# Patient Record
Sex: MALE | Race: WHITE | URBAN - METROPOLITAN AREA
[De-identification: names, ages, dates, MRNs, and addresses within clinical notes are randomized per-mention and may not be internally consistent; named-entity substitution may affect disease eponyms.]

---

## 2018-09-10 ENCOUNTER — OFFICE VISIT (OUTPATIENT)
Dept: ENDOCRINOLOGY | Age: 48
End: 2018-09-10

## 2018-09-10 VITALS
HEIGHT: 70 IN | HEART RATE: 61 BPM | SYSTOLIC BLOOD PRESSURE: 98 MMHG | WEIGHT: 165.5 LBS | DIASTOLIC BLOOD PRESSURE: 61 MMHG | BODY MASS INDEX: 23.69 KG/M2

## 2018-09-10 DIAGNOSIS — E03.9 ACQUIRED HYPOTHYROIDISM: Primary | ICD-10-CM

## 2018-09-10 NOTE — MR AVS SNAPSHOT
[address] [place] [address] [address] [address] [phone] Patient: Cem Coleman MRN: [id number] TNN:4/64/8109 Visit Information Date & Time Provider Department Dept. Phone Encounter #  
 9/10/2018  2:50 PM [address] [address] [place] [facility] [phone] [id number] Follow-up Instructions Return in about 2 months (around 11/10/2018). Upcoming Health Maintenance Date Due DTaP/Tdap/Td series (1 - Tdap) 5/30/1989 FOBT Q 1 YEAR AGE 50-75 5/30/2018 Influenza Age 5 to Adult 8/1/2018 Allergies as of 9/10/2018  Review Complete On: 9/10/2018 By: Elbert Murrieta MD  
 No Known Allergies Current Immunizations  Never Reviewed No immunizations on file. Not reviewed this visit You Were Diagnosed With   
  
 Codes Comments Acquired hypothyroidism    -  Primary ICD-10-CM: E03.9 ICD-9-CM: 617. 9 Vitals BP Pulse Height(growth percentile) Weight(growth percentile) BMI Smoking Status 98/61 (BP 1 Location: Left arm, BP Patient Position: Sitting) 61 5' 10\" (1.778 m) 165 lb 8 oz (75.1 kg) 23.75 kg/m2 Never Smoker BMI and BSA Data Body Mass Index Body Surface Area  
 23.75 kg/m 2 1.93 m 2 Preferred Pharmacy Pharmacy Name Phone [id number] [phone] Your Updated Medication List  
  
Notice  As of 9/10/2018  3:10 PM  
 You have not been prescribed any medications. We Performed the Following T4, FREE G1011922 CPT(R)] THYROGLOBULIN AB [id number] CPT(R)] THYROID PEROXIDASE (TPO) AB [34631 CPT(R)] TSH 3RD GENERATION [45141 CPT(R)] Follow-up Instructions Return in about 2 months (around 11/10/2018). Patient Instructions PLEASE READ THESE INSTRUCTIONS:  
 
 If we start thyroid hormone, reemember to take levothyroxine with a glass of water on an empty stomach each day in the mornings, 1 hour prior to ingesting any food or other medications, including vitamins. We will plan for you to return to clinic in 2 months for re-evaluation, Devon Toribio. [address] [facility] End[facility] SERVICES! Dear Emelia Tran: Thank you for requesting a [employer] account. Our records indicate that you already have an active [employer] account. You can access your account anytime at [email or web address] Did you know that you can access your hospital and ER discharge instructions at any time in [employer]? You can also review all of your test results from your hospital stay or ER visit. Additional Information If you have questions, please visit the Frequently Asked Questions section of the [employer] website at [email or web address] Remember, [employer] is NOT to be used for urgent needs. For medical emergencies, dial 911. Now available from your iPhone and Android! Please provide this summary of care documentation to your next provider. Your primary care clinician is listed as Phys Other. If you have any questions after today's visit, please call [phone].

## 2018-09-10 NOTE — PROGRESS NOTES
CONSULTATION REQUESTED BY: [facility]    REASON FOR CONSULT: Hypothyroidism    CHIEF COMPLAINT: Evaluation for hypothyroidism    HISTORY OF PRESENT ILLNESS:   Eusebia Young is a 46 y.o. male with a PMHx as noted below who was referred to our endocrinology clinic for evaluation of hypothyroidism. Thyroid History:  Diagnosed by [facility] Nov 2017 with TSH 4.69,  He was prescribed levothyroxine but did not start it,  Patient denies history of trauma/surgery in the neck,  Family history is not significant for thryoid disease,  Symptoms reported include fatigue, progressive,  No goiter or dysphagia,  Review of outside lab: 11/8/17: TSH 4.69  Patient has not had a subsequent evaluation since that time. He is not on any medications and no history of chronic medical conditions. PAST MEDICAL/SURGICAL HISTORY:   No past medical history on file. No past surgical history on file. ALLERGIES:   No Known Allergies    MEDICATIONS ON ADMISSION:   No current outpatient prescriptions on file. SOCIAL HISTORY:   Social History     Social History    Marital status:      Spouse name: N/A    Number of children: N/A    Years of education: N/A     Occupational History    Not on file. Social History Main Topics    Smoking status: Never Smoker    Smokeless tobacco: Never Used    Alcohol use Yes    Drug use: Not on file    Sexual activity: Not on file     Other Topics Concern    Not on file     Social History Narrative       FAMILY HISTORY:  Family History   Problem Relation Age of Onset    Cancer Father      bladder cancer age 76       REVIEW OF SYSTEMS: Complete ROS assessed and noted for that which is described above, all else are negative.   Eyes: normal  ENT: normal  CVS: normal  Resp: normal  GI: normal  : normal  GYN: normal  Endocrine: normal  Integument: normal  Musculoskeletal: normal  Neuro: normal  Psych: normal      PHYSICAL EXAMINATION:    VITAL SIGNS:  Visit Vitals    BP 98/61 (BP 1 Location: Left arm, BP Patient Position: Sitting)    Pulse 61    Ht 5' 10\" (1.778 m)    Wt 165 lb 8 oz (75.1 kg)    BMI 23.75 kg/m2       GENERAL: NCAT, Sitting comfortably, NAD  EYES: EOMI, non-icteric, no proptosis  Ear/Nose/Throat: NCAT, no inflammation, no masses, thyroid gland is not appreciably enlarged  LYMPH NODES: No LAD  CARDIOVASCULAR: S1 S2, RRR, No murmur, 2+ radial pulses  RESPIRATORY: CTA b/l, no wheeze/rales  GASTROINTESTINAL: ND  MUSCULOSKELETAL: Normal ROM, no atrophy  SKIN: warm, no edema/rash/ or other skin changes  NEUROLOGIC: 5/5 power all extremities, no tremor, AAOx3  PSYCHIATRIC: Normal affect, Normal insight and judgement     REVIEW OF LABORATORY AND RADIOLOGY DATA:   Labs and documentation have been reviewed as described above. ASSESSMENT AND PLAN:   Michael Raymundo is a 46 y.o. male with a PMHx as noted above who was referred to our endocrinology clinic for evaluation of hypothyroidism. Hypothyroidism    Today, using a drawing board, we spent time discussing the physiologic mechanisms which govern thyroid hormone regulation and the normal responses to abnormal thyroid function. We also discussed their current condition in the setting of this physiologic response. In his particular case, his symptoms are suggestive of potential hypothyroidism, however we would certainly need to evaluate his current levels, and will check for thyroid antibodies as well which would help with diagnosis/prognosis. Today we will check a TSH & FT4 level, and TPO/TgAb,  Will review results and determine if thyroid hormone replacement is appropriate,  Advised that if started on treatment, to take levothyroxine with a glass of water on an empty stomach each day in the mornings, 1 hour prior to ingesting any food or other medications, including vitamins. Plan to RTC in 2 months, will order prelabs depending on results,    [employer].  [facility] Endocr[facility]

## 2018-09-10 NOTE — PATIENT INSTRUCTIONS
PLEASE READ THESE INSTRUCTIONS:     If we start thyroid hormone, reemember to take levothyroxine with a glass of water on an empty stomach each day in the mornings, 1 hour prior to ingesting any food or other medications, including vitamins. We will plan for you to return to clinic in 2 months for re-evaluation,    Edmund Norton.  [address] [facility] Endocrinology  [address] [place] [address]

## 2018-09-11 LAB
T4 FREE SERPL-MCNC: 1.16 NG/DL (ref 0.82–1.77)
THYROGLOB AB SERPL-ACNC: <1 IU/ML (ref 0–0.9)
THYROPEROXIDASE AB SERPL-ACNC: 11 IU/ML (ref 0–34)
TSH SERPL DL<=0.005 MIU/L-ACNC: 2.62 UIU/ML (ref 0.45–4.5)

## 2018-09-11 NOTE — PROGRESS NOTES
Thyroid levels look normal,  TSH 2.6, FT4 1.16,  Tg Ab negative, TPO antibody negative,  no evidence of thyroid disease, also no goiter on exam, no role for thyroid hormone replacement at the present time. I called patient to notify him,     Willy Aguilar.  [address] [facility] Endocrinology  [address] [place] [address]

## 2018-11-30 ENCOUNTER — OFFICE VISIT (OUTPATIENT)
Dept: ENDOCRINOLOGY | Age: 48
End: 2018-11-30

## 2018-11-30 VITALS
BODY MASS INDEX: 23.69 KG/M2 | DIASTOLIC BLOOD PRESSURE: 60 MMHG | SYSTOLIC BLOOD PRESSURE: 95 MMHG | HEART RATE: 72 BPM | HEIGHT: 70 IN | WEIGHT: 165.5 LBS

## 2018-11-30 DIAGNOSIS — E03.9 ACQUIRED HYPOTHYROIDISM: Primary | ICD-10-CM

## 2018-11-30 NOTE — PROGRESS NOTES
CHIEF COMPLAINT: f/u evaluation for hypothyroidism. HISTORY OF PRESENT ILLNESS:   Shoaib Escalante is a 46 y.o. male with a PMHx as noted below who presents to the endocrinology clinic for f/u evaluation of hypothyroidism. Diagnosed by [facility] Nov 2017 with TSH 4.69,  He was prescribed levothyroxine but did not start it,  We evaluated patient prev on initial visit and was noted for normal thyroid levels, neg TPO,   Patient was advised thyroid hormone replacement was not warranted,   He presents today for routine f/u,   Patient note some routine fatigue, denies dysphagia,   Review of most recent thyroid function:  Lab Results   Component Value Date    TSH 2.620 09/10/2018    FT4 1.16 09/10/2018    TMCLT 11 09/10/2018      TSILT = Thyroid stimulating antibodies  TMCLT = TPO antibodies  T3LT = Total T3 levels    PAST MEDICAL/SURGICAL HISTORY:   No past medical history on file. No past surgical history on file. ALLERGIES:   No Known Allergies    MEDICATIONS ON ADMISSION:   No current outpatient medications on file. SOCIAL HISTORY:   Social History     Socioeconomic History    Marital status:      Spouse name: Not on file    Number of children: Not on file    Years of education: Not on file    Highest education level: Not on file   Social Needs    Financial resource strain: Not on file    Food insecurity - worry: Not on file    Food insecurity - inability: Not on file    Transportation needs - medical: Not on file   [employer] needs - non-medical: Not on file   Occupational History    Not on file   Tobacco Use    Smoking status: Never Smoker    Smokeless tobacco: Never Used   Substance and Sexual Activity    Alcohol use:  Yes    Drug use: Not on file    Sexual activity: Not on file   Other Topics Concern    Not on file   Social History Narrative    Not on file       FAMILY HISTORY:  Family History   Problem Relation Age of Onset    Cancer Father         bladder cancer age 68       REVIEW OF SYSTEMS: Complete ROS assessed and noted for that which is described above, all else are negative. Eyes: normal  ENT: normal  CVS: normal  Resp: normal  GI: normal  : normal  GYN: normal  Endocrine: normal  Integument: normal  Musculoskeletal: normal  Neuro: normal  Psych: normal      PHYSICAL EXAMINATION:    VITAL SIGNS:  Visit Vitals  BP 95/60 (BP 1 Location: Left arm, BP Patient Position: Sitting)   Pulse 72   Ht 5' 10\" (1.778 m)   Wt 165 lb 8 oz (75.1 kg)   BMI 23.75 kg/m²       GENERAL: NCAT, Sitting comfortably, NAD  EYES: EOMI, non-icteric, no proptosis  Ear/Nose/Throat: NCAT, no inflammation, no masses  LYMPH NODES: No LAD  CARDIOVASCULAR: S1 S2, RRR, No murmur, 2+ radial pulses  RESPIRATORY: CTA b/l, no wheeze/rales  GASTROINTESTINAL:  ND  MUSCULOSKELETAL: Normal ROM, no atrophy  SKIN: warm, no edema/rash/ or other skin changes  NEUROLOGIC: 5/5 power all extremities, no tremors, AAOx3  PSYCHIATRIC: Normal affect, Normal insight and judgement      REVIEW OF LABORATORY AND RADIOLOGY DATA:   Labs and documentation have been reviewed as described above. ASSESSMENT AND PLAN:   Princess Nieves is a 46 y.o. male with a PMHx as noted above who presents to the endocrinology clinic for the f/u evaluation of hypothyroidism. Hypothyroidism    No prior evidence of thyroid disease,   We will recheck thyroid levels today with TgAb,   Will discuss results by phone,     RTC PRN, will schedule if result is abnormal,     [facility].  [facility]

## 2018-12-01 LAB
T4 FREE SERPL-MCNC: 1.19 NG/DL (ref 0.82–1.77)
THYROGLOB AB SERPL-ACNC: <1 IU/ML (ref 0–0.9)
TSH SERPL DL<=0.005 MIU/L-ACNC: 3.36 UIU/ML (ref 0.45–4.5)

## 2018-12-03 NOTE — PROGRESS NOTES
Thyroid levels again in the normal range,   TgAb is negative as was the TPO antibody previously,   No evidence of active thyroid disease,  I called patient to advise him,   Welcome back GUERO,     Rhina ARANA.  39 [facility] Endocrinology  [address] [place] [address]

## 2020-10-16 ENCOUNTER — TRANSCRIBE ORDER (OUTPATIENT)
Dept: SCHEDULING | Age: 50
End: 2020-10-16

## 2020-10-16 DIAGNOSIS — R22.1 NECK FULLNESS: ICD-10-CM

## 2020-10-16 DIAGNOSIS — M54.2 NECK PAIN: Primary | ICD-10-CM

## 2020-10-16 DIAGNOSIS — R13.10 DYSPHAGIA: ICD-10-CM

## 2025-04-03 ENCOUNTER — HOSPITAL ENCOUNTER (OUTPATIENT)
Facility: HOSPITAL | Age: 55
Discharge: HOME | End: 2025-04-03
Attending: INTERNAL MEDICINE
Payer: COMMERCIAL

## 2025-04-03 ENCOUNTER — TRANSCRIBE ORDERS (OUTPATIENT)
Facility: HOSPITAL | Age: 55
End: 2025-04-03

## 2025-04-03 DIAGNOSIS — R07.89 CHEST DISCOMFORT: Primary | ICD-10-CM

## 2025-04-03 DIAGNOSIS — R07.89 CHEST DISCOMFORT: ICD-10-CM

## 2025-04-03 PROCEDURE — 71046 X-RAY EXAM CHEST 2 VIEWS: CPT
